# Patient Record
Sex: MALE | Race: BLACK OR AFRICAN AMERICAN | NOT HISPANIC OR LATINO
[De-identification: names, ages, dates, MRNs, and addresses within clinical notes are randomized per-mention and may not be internally consistent; named-entity substitution may affect disease eponyms.]

---

## 2023-03-20 ENCOUNTER — APPOINTMENT (OUTPATIENT)
Dept: VASCULAR SURGERY | Facility: CLINIC | Age: 58
End: 2023-03-20
Payer: MEDICARE

## 2023-03-20 VITALS — BODY MASS INDEX: 41.62 KG/M2 | TEMPERATURE: 98 F | WEIGHT: 259 LBS | HEIGHT: 66 IN

## 2023-03-20 VITALS — HEART RATE: 82 BPM | SYSTOLIC BLOOD PRESSURE: 121 MMHG | DIASTOLIC BLOOD PRESSURE: 83 MMHG

## 2023-03-20 PROCEDURE — 93970 EXTREMITY STUDY: CPT

## 2023-03-20 PROCEDURE — 99203 OFFICE O/P NEW LOW 30 MIN: CPT

## 2023-03-20 NOTE — ASSESSMENT
[FreeTextEntry1] : Impression venous insuff  and ongoing  rle dvt w  s/o hypercoag state\par \par \par Plan Med Conservative management leg elevation, knee high compression stockings 20-30mm Hg (rx given)\par continue xarelto  20 mg  till next ov \par d/w pt my s/o hypercoag states and recommended  hematologist to eval pt\par Referred to Dr DEMETRIS Mayorga MD \par d/w pt f/u w hematologist after rle dvt resolution \par rto in 4 weeks for rle venous duplex s/o dvt  then telehealth\par \par \par Letter faxed to Dr KAISER Adam MD \par \par \par \par Varicose veins are enlarged, twisted veins. Varicose veins are caused by increased blood pressure in the veins.  The blood moves towards the heart by 1-way valves in the veins. When the valves become weakened or damaged, blood can collect in the veins and pool in your lower legs (ankles). This causes the veins to become enlarged and incompetent with reflux. Sitting or standing for long periods can cause blood to pool in the leg veins, increasing the pressure within the veins. \par Risk factors for varicose veins or venous disease may include:  obesity, older age, standing or sitting for prolonged periods of time for several years, being female, pregnancy, taking oral contraceptive pills or hormone replacement, being inactive, and/or smoking. \par The most common symptoms of varicose veins are sensations in the legs, such as a heavy feeling, burning, and/or aching. However, each individual may experience symptoms differently.  Other symptoms may include:  color changes in the skin, sores on the legs, or rash.  Severe varicose veins or venous disease may eventually produce long-term mild swelling that can result in more serious skin and tissue problems, such as ulcers and non-healing sores.\par Varicose veins and venous disease are diagnosed by a complete medical history, physical examination, and diagnostic studies for varicose veins including duplex ultrasound and color-flow imaging.  \par Medical treatment for varicose veins and venous disease include:  compression stockings, sclerotherapy, endovenous ablation and/or surgical treatment with microphlebectomy.  \par \par \par  [Arterial/Venous Disease] : arterial/venous disease [Medication Management] : medication management [Other: _____] : [unfilled]

## 2023-03-20 NOTE — HISTORY OF PRESENT ILLNESS
[FreeTextEntry1] : pt states new onset of right calf pain \par dx w rt  PTV dvt in  jan 2023 and was placed on po xarelto\par \par Pt c/o bilateral leg swelling heaviness w activity even prior to this event \par Onset more than 6 mo ago \par Intensity mild \par Pt denies recent injury, travel or thrombophilic event\par \par Pt denies any recent  cardiac c/o , SOB, Chest Pain or recent heart attacks \par \par Pt denies any other  personal or fam  thrombophilic event \par pt denies recent  travel or airtravel or inj\par \par pt c/o ongoing left foot numbness after left foot surgery in 2014\par pt also states currently unrelated to activity  serina leg cramps \par pt states in the past eval  for  lle  neuro c/o w w/u sig for LSS  "problem"\par pt states no f/u was needed or recommended \par \par

## 2023-03-20 NOTE — PHYSICAL EXAM
[1+] : left 1+ [2+] : left 2+ [Ankle Swelling (On Exam)] : present [Ankle Swelling Bilaterally] : bilaterally  [Varicose Veins Of Lower Extremities] : bilaterally [] : bilaterally [Ankle Swelling On The Right] : mild [No Rash or Lesion] : No rash or lesion [Alert] : alert [Oriented to Person] : oriented to person [Oriented to Place] : oriented to place [Oriented to Time] : oriented to time [Calm] : calm [Right Carotid Bruit] : no bruit heard over the right carotid [Left Carotid Bruit] : no bruit heard over the left carotid [Abdomen Masses] : No abdominal masses [de-identified] : nad [de-identified] : wnl [FreeTextEntry1] : Mild  bilateral leg venous insufficiency \par w mild bilateral leg stasis dermatitis, hyperpigmentation in the gator area\par and mild bilateral leg edema \par Multiple  bilateral leg small varicose  veins and spider veins  ant post medial calf and shin \par RLE Varicose veins measuring  1-2 mm in size on the calf /shin \par LLE Varicose veins measuring 1-2  mm in size on the calf /shin \par no wounds/ulcers\par  [de-identified] : wnl [de-identified] : Mitch Cranial nerves 2-12 mitch grossly intact [de-identified] : cooperative

## 2023-03-20 NOTE — DATA REVIEWED
[FreeTextEntry1] : Outside facility study report reviewed 1/31/2023  RLE venous Duplex  sig for  PTV dvt \par \par Outside facility study report reviewed 2/17/2023  CTPA no evid of PE \par \par Outside facility study report reviewed 3/3/2023  RLE venous Duplex  no evid of dvt at this time \par \par 3/20/2023 Venous Doppler \par                            RLE sub acute  dvt svt in post tib vein\par                            LLE no dvt svt\par

## 2023-04-24 ENCOUNTER — APPOINTMENT (OUTPATIENT)
Dept: VASCULAR SURGERY | Facility: CLINIC | Age: 58
End: 2023-04-24
Payer: MEDICARE

## 2023-04-24 PROCEDURE — 93971 EXTREMITY STUDY: CPT | Mod: RT

## 2023-05-03 ENCOUNTER — APPOINTMENT (OUTPATIENT)
Dept: VASCULAR SURGERY | Facility: CLINIC | Age: 58
End: 2023-05-03
Payer: MEDICARE

## 2023-05-03 DIAGNOSIS — I82.441 ACUTE EMBOLISM AND THROMBOSIS OF RIGHT TIBIAL VEIN: ICD-10-CM

## 2023-05-03 PROCEDURE — 99442: CPT | Mod: 95

## 2023-05-03 NOTE — HISTORY OF PRESENT ILLNESS
[FreeTextEntry1] : pt states new onset of right calf pain \par dx w rt  PTV dvt in  jan 2023 and was placed on po xarelto\par \par Pt c/o bilateral leg swelling heaviness w activity even prior to this event \par Onset more than 6 mo ago \par Intensity mild \par Pt denies recent injury, travel or thrombophilic event\par \par Pt denies any recent  cardiac c/o , SOB, Chest Pain or recent heart attacks \par \par Pt denies any other  personal or fam  thrombophilic event \par pt denies recent  travel or airtravel or inj\par \par pt c/o ongoing left foot numbness after left foot surgery in 2014\par pt also states currently unrelated to activity  serina leg cramps \par pt states in the past eval  for  lle  neuro c/o w w/u sig for LSS  "problem"\par pt states no f/u was needed or recommended \par \par  [de-identified] : pt is on xarelto\par pt states no new c/o\par pt is thinking of traveling to [place] at the end of may 2023

## 2023-05-03 NOTE — DATA REVIEWED
[FreeTextEntry1] : Outside facility study report reviewed 1/31/2023  RLE venous Duplex  sig for  PTV dvt \par \par Outside facility study report reviewed 2/17/2023  CTPA no evid of PE \par \par Outside facility study report reviewed 3/3/2023  RLE venous Duplex  no evid of dvt at this time \par \par 3/20/2023 Venous Doppler \par                            RLE sub acute  dvt in post tib vein\par                            LLE no dvt svt\par                                \par 4/24/2023   Venous Doppler \par                            RLE acute  dvt in post tib vein\par                                    chronic changes in  LSV \par \par                            \par

## 2023-05-03 NOTE — PHYSICAL EXAM
[1+] : right 1+ [No Rash or Lesion] : No rash or lesion [Alert] : alert [Oriented to Person] : oriented to person [Oriented to Place] : oriented to place [Oriented to Time] : oriented to time [Calm] : calm [de-identified] : no resp distress [FreeTextEntry1] : Physical exam findings via telephonic review with patient \par \par  [de-identified] : cooperative

## 2023-05-03 NOTE — REASON FOR VISIT
[Home] : at home, [unfilled] , at the time of the visit. [Medical Office: ([facility])___] : at the medical office located in  [Other:____] : [unfilled] [Verbal consent obtained from patient] : the patient, [unfilled] [FreeTextEntry1] : i have a right leg blood clot

## 2023-05-03 NOTE — ASSESSMENT
[Arterial/Venous Disease] : arterial/venous disease [Medication Management] : medication management [Other: _____] : [unfilled] [FreeTextEntry1] : Impression venous insuff  and ongoing  rle dvt w  s/o hypercoag state\par \par \par Plan Med Conservative management leg elevation, knee high compression stockings 20-30mm Hg \par d/w pt that he may be a xarelto failure and  to consider  changing  to lovenox  or coumadin rx\par w inr surveillance \par pt wants to continue on xarelto for now\par i advised him if  limited progress occurs at next ov then will need  to be switched to lovenox or coumadin\par and at the  resolution of the dvt  may need a hypercoag w/u also \par continue xarelto  20 mg  till next ov \par above d/w Dr Jaramillo  heme \par advised pt to f/u w heme \par advised pt to not travel for now \par rto in 4 weeks for rle venous duplex s/o dvt  then telehealth\par Telephonic visit  time duration  14  min\par \par \par \par \par Letter faxed to Dr KAISER Adam MD \par \par \par \par Varicose veins are enlarged, twisted veins. Varicose veins are caused by increased blood pressure in the veins.  The blood moves towards the heart by 1-way valves in the veins. When the valves become weakened or damaged, blood can collect in the veins and pool in your lower legs (ankles). This causes the veins to become enlarged and incompetent with reflux. Sitting or standing for long periods can cause blood to pool in the leg veins, increasing the pressure within the veins. \par Risk factors for varicose veins or venous disease may include:  obesity, older age, standing or sitting for prolonged periods of time for several years, being female, pregnancy, taking oral contraceptive pills or hormone replacement, being inactive, and/or smoking. \par The most common symptoms of varicose veins are sensations in the legs, such as a heavy feeling, burning, and/or aching. However, each individual may experience symptoms differently.  Other symptoms may include:  color changes in the skin, sores on the legs, or rash.  Severe varicose veins or venous disease may eventually produce long-term mild swelling that can result in more serious skin and tissue problems, such as ulcers and non-healing sores.\par Varicose veins and venous disease are diagnosed by a complete medical history, physical examination, and diagnostic studies for varicose veins including duplex ultrasound and color-flow imaging.  \par Medical treatment for varicose veins and venous disease include:  compression stockings, sclerotherapy, endovenous ablation and/or surgical treatment with microphlebectomy.  \par \par \par

## 2023-06-06 ENCOUNTER — APPOINTMENT (OUTPATIENT)
Dept: VASCULAR SURGERY | Facility: CLINIC | Age: 58
End: 2023-06-06
Payer: MEDICARE

## 2023-06-06 VITALS
HEIGHT: 66 IN | HEART RATE: 85 BPM | BODY MASS INDEX: 41.62 KG/M2 | DIASTOLIC BLOOD PRESSURE: 82 MMHG | SYSTOLIC BLOOD PRESSURE: 115 MMHG | WEIGHT: 259 LBS | TEMPERATURE: 98 F

## 2023-06-06 PROCEDURE — 93971 EXTREMITY STUDY: CPT | Mod: RT

## 2023-06-06 PROCEDURE — 99214 OFFICE O/P EST MOD 30 MIN: CPT

## 2023-06-06 RX ORDER — TELMISARTAN AND HYDROCHLOROTHIAZIDE 80; 25 MG/1; MG/1
80-25 TABLET ORAL
Refills: 0 | Status: ACTIVE | COMMUNITY

## 2023-06-06 RX ORDER — RIVAROXABAN 2.5 MG/1
TABLET, FILM COATED ORAL
Refills: 0 | Status: ACTIVE | COMMUNITY

## 2023-06-06 NOTE — HISTORY OF PRESENT ILLNESS
[FreeTextEntry1] : pt states new onset of right calf pain \par dx w rt  PTV dvt in  jan 2023 and was placed on po xarelto\par \par Pt c/o bilateral leg swelling heaviness w activity even prior to this event \par Onset more than 6 mo ago \par Intensity mild \par Pt denies recent injury, travel or thrombophilic event\par \par Pt denies any recent  cardiac c/o , SOB, Chest Pain or recent heart attacks \par \par Pt denies any other  personal or fam  thrombophilic event \par pt denies recent  travel or airtravel or inj\par \par pt c/o ongoing left foot numbness after left foot surgery in 2014\par pt also states currently unrelated to activity  serina leg cramps \par pt states in the past eval  for  lle  neuro c/o w w/u sig for LSS  "problem"\par pt states no f/u was needed or recommended \par \par  [de-identified] : pt here for follow up for RLE dvt\par seen in the presence of his wife\par taking xarelto 20 mg daily\par states RLE swelling has improved \par experiences occasional discomfort on the right calf\par consistent with leg elevation\par wears knee high compression stockings 1-2x/week\par no new wounds or ulcers\par

## 2023-06-06 NOTE — DATA REVIEWED
[FreeTextEntry1] : Outside facility study report reviewed 1/31/2023  RLE venous Duplex  sig for  PTV dvt \par \par Outside facility study report reviewed 2/17/2023  CTPA no evid of PE \par \par Outside facility study report reviewed 3/3/2023  RLE venous Duplex  no evid of dvt at this time \par \par 3/20/2023 Venous Doppler \par                            RLE sub acute  dvt in post tib vein\par                            LLE no dvt svt\par                                \par 4/24/2023   Venous Doppler \par                            RLE acute  dvt in post tib vein\par                                    chronic changes in  LSV \par \par 6/6/2023     Venous doppler\par                            RLE chronic dvt with partial recanalization in post tib vein\par                                   chronic changes in LSV\par \par                            \par

## 2023-06-06 NOTE — PHYSICAL EXAM
[1+] : left 1+ [2+] : left 2+ [Ankle Swelling (On Exam)] : present [Ankle Swelling Bilaterally] : bilaterally  [Varicose Veins Of Lower Extremities] : bilaterally [] : bilaterally [Ankle Swelling On The Right] : mild [No Rash or Lesion] : No rash or lesion [Alert] : alert [Oriented to Person] : oriented to person [Oriented to Place] : oriented to place [Oriented to Time] : oriented to time [Calm] : calm [Right Carotid Bruit] : no bruit heard over the right carotid [Left Carotid Bruit] : no bruit heard over the left carotid [Abdomen Masses] : No abdominal masses [de-identified] : nad [de-identified] : wnl [de-identified] : no respiratory distress [FreeTextEntry1] : Mild  bilateral leg venous insufficiency \par w mild bilateral leg stasis dermatitis, hyperpigmentation in the gator area\par and mild bilateral leg edema \par Multiple  bilateral leg small varicose  veins and spider veins  ant post medial calf and shin \par RLE Varicose veins measuring  1-2 mm in size on the calf /shin \par LLE Varicose veins measuring 1-2  mm in size on the calf /shin \par no wounds/ulcers\par  [de-identified] : Mitch Cranial nerves 2-12 mitch grossly intact [de-identified] : wnl [de-identified] : cooperative

## 2023-06-06 NOTE — ASSESSMENT
[Arterial/Venous Disease] : arterial/venous disease [Medication Management] : medication management [Other: _____] : [unfilled] [FreeTextEntry1] : Impression venous insuff  and slow resolution of  rle dvt  s/o hypercoag state\par \par \par Plan Med Conservative management leg elevation, weight loss, diet control, exercise regimen, knee high compression stockings 20-30mm Hg \par advised pt to f/u w heme for hypercoag w/u\par continue xarelto 20 mg for 2 weeks\par switch to baby aspirin daily after completing xarelto \par rto in 4 weeks for rle venous duplex s/o dvt  to re eval then telehealth\par \par \par \par \par \par Varicose veins are enlarged, twisted veins. Varicose veins are caused by increased blood pressure in the veins.  The blood moves towards the heart by 1-way valves in the veins. When the valves become weakened or damaged, blood can collect in the veins and pool in your lower legs (ankles). This causes the veins to become enlarged and incompetent with reflux. Sitting or standing for long periods can cause blood to pool in the leg veins, increasing the pressure within the veins. \par Risk factors for varicose veins or venous disease may include:  obesity, older age, standing or sitting for prolonged periods of time for several years, being female, pregnancy, taking oral contraceptive pills or hormone replacement, being inactive, and/or smoking. \par The most common symptoms of varicose veins are sensations in the legs, such as a heavy feeling, burning, and/or aching. However, each individual may experience symptoms differently.  Other symptoms may include:  color changes in the skin, sores on the legs, or rash.  Severe varicose veins or venous disease may eventually produce long-term mild swelling that can result in more serious skin and tissue problems, such as ulcers and non-healing sores.\par Varicose veins and venous disease are diagnosed by a complete medical history, physical examination, and diagnostic studies for varicose veins including duplex ultrasound and color-flow imaging.  \par Medical treatment for varicose veins and venous disease include:  compression stockings, sclerotherapy, endovenous ablation and/or surgical treatment with microphlebectomy.  \par \par \par

## 2023-06-25 ENCOUNTER — TRANSCRIPTION ENCOUNTER (OUTPATIENT)
Age: 58
End: 2023-06-25

## 2023-07-11 ENCOUNTER — APPOINTMENT (OUTPATIENT)
Dept: VASCULAR SURGERY | Facility: CLINIC | Age: 58
End: 2023-07-11
Payer: MEDICARE

## 2023-07-11 VITALS
DIASTOLIC BLOOD PRESSURE: 76 MMHG | SYSTOLIC BLOOD PRESSURE: 114 MMHG | TEMPERATURE: 97.9 F | HEIGHT: 66 IN | BODY MASS INDEX: 43.55 KG/M2 | WEIGHT: 271 LBS | HEART RATE: 62 BPM

## 2023-07-11 VITALS — DIASTOLIC BLOOD PRESSURE: 76 MMHG | HEART RATE: 62 BPM | SYSTOLIC BLOOD PRESSURE: 114 MMHG

## 2023-07-11 PROCEDURE — 93971 EXTREMITY STUDY: CPT | Mod: RT

## 2023-07-11 PROCEDURE — 99214 OFFICE O/P EST MOD 30 MIN: CPT

## 2023-07-11 NOTE — PHYSICAL EXAM
[1+] : left 1+ [2+] : left 2+ [Ankle Swelling (On Exam)] : present [Ankle Swelling Bilaterally] : bilaterally  [Varicose Veins Of Lower Extremities] : bilaterally [] : bilaterally [Ankle Swelling On The Right] : mild [No Rash or Lesion] : No rash or lesion [Alert] : alert [Oriented to Person] : oriented to person [Oriented to Place] : oriented to place [Oriented to Time] : oriented to time [Calm] : calm [Right Carotid Bruit] : no bruit heard over the right carotid [Left Carotid Bruit] : no bruit heard over the left carotid [Abdomen Masses] : No abdominal masses [de-identified] : nad [de-identified] : wnl [de-identified] : no respiratory distress [FreeTextEntry1] : Mild  bilateral leg venous insufficiency \par w mild bilateral leg stasis dermatitis, hyperpigmentation in the gator area\par and mild bilateral leg edema \par Multiple  bilateral leg small varicose  veins and spider veins  ant post medial calf and shin \par RLE Varicose veins measuring  1-2 mm in size on the calf /shin \par LLE Varicose veins measuring 1-2  mm in size on the calf /shin \par no wounds/ulcers\par  [de-identified] : wnl [de-identified] : Mitch Cranial nerves 2-12 mitch grossly intact [de-identified] : cooperative

## 2023-07-11 NOTE — HISTORY OF PRESENT ILLNESS
[FreeTextEntry1] : pt states new onset of right calf pain \par dx w rt  PTV dvt in  jan 2023 and was placed on po xarelto\par \par Pt c/o bilateral leg swelling heaviness w activity even prior to this event \par Onset more than 6 mo ago \par Intensity mild \par Pt denies recent injury, travel or thrombophilic event\par \par Pt denies any recent  cardiac c/o , SOB, Chest Pain or recent heart attacks \par \par Pt denies any other  personal or fam  thrombophilic event \par pt denies recent  travel or airtravel or inj\par \par pt c/o ongoing left foot numbness after left foot surgery in 2014\par pt also states currently unrelated to activity  serina leg cramps \par pt states in the past eval  for  lle  neuro c/o w w/u sig for LSS  "problem"\par pt states no f/u was needed or recommended \par \par  [de-identified] : pt here for 1 month follow up for RLE dvt\par experiences occasional aches in bilateral calf\par overall pt legs feel the same compared to last visit\par consistent with leg elevation\par wears knee high compression stockings 4x/week\par no longer takes xarelto\par taking low dose asa daily\par no new wounds or ulcers\par pt states he has a follow up appointment with his hematologist later on this month\par

## 2023-07-11 NOTE — ASSESSMENT
[Arterial/Venous Disease] : arterial/venous disease [Medication Management] : medication management [Other: _____] : [unfilled] [FreeTextEntry1] : Impression venous insuff  and slow resolution of  rle dvt  s/o hypercoag state, chronic rle dvt in ptv\par \par \par Plan Med Conservative management leg elevation, weight loss, diet control, exercise regimen, knee high compression stockings 20-30mm Hg \par advised pt to f/u w heme for hypercoag w/u\par continue low dose asa daily\par telehealth in December 2023 to evaluate bilateral lower extremities\par \par \par \par \par \par Varicose veins are enlarged, twisted veins. Varicose veins are caused by increased blood pressure in the veins.  The blood moves towards the heart by 1-way valves in the veins. When the valves become weakened or damaged, blood can collect in the veins and pool in your lower legs (ankles). This causes the veins to become enlarged and incompetent with reflux. Sitting or standing for long periods can cause blood to pool in the leg veins, increasing the pressure within the veins. \par Risk factors for varicose veins or venous disease may include:  obesity, older age, standing or sitting for prolonged periods of time for several years, being female, pregnancy, taking oral contraceptive pills or hormone replacement, being inactive, and/or smoking. \par The most common symptoms of varicose veins are sensations in the legs, such as a heavy feeling, burning, and/or aching. However, each individual may experience symptoms differently.  Other symptoms may include:  color changes in the skin, sores on the legs, or rash.  Severe varicose veins or venous disease may eventually produce long-term mild swelling that can result in more serious skin and tissue problems, such as ulcers and non-healing sores.\par Varicose veins and venous disease are diagnosed by a complete medical history, physical examination, and diagnostic studies for varicose veins including duplex ultrasound and color-flow imaging.  \par Medical treatment for varicose veins and venous disease include:  compression stockings, sclerotherapy, endovenous ablation and/or surgical treatment with microphlebectomy.  \par \par \par

## 2023-07-11 NOTE — DATA REVIEWED
[FreeTextEntry1] : Outside facility study report reviewed 1/31/2023  RLE venous Duplex  sig for  PTV dvt \par \par Outside facility study report reviewed 2/17/2023  CTPA no evid of PE \par \par Outside facility study report reviewed 3/3/2023  RLE venous Duplex  no evid of dvt at this time \par \par 3/20/2023 Venous Doppler \par                            RLE sub acute  dvt in post tib vein\par                            LLE no dvt svt\par                                \par 4/24/2023   Venous Doppler \par                            RLE acute  dvt in post tib vein\par                                    chronic changes in  LSV \par \par 6/6/2023     Venous doppler\par                            RLE chronic dvt with partial recanalization in post tib vein\par                                   chronic changes in LSV\par \par 7/11/2023 Venous Doppler\par                           RLE minimal chronic thrombus with recanalization in post tib vein mid calf\par                                    chronic changes in LSV                           \par

## 2023-10-10 ENCOUNTER — APPOINTMENT (OUTPATIENT)
Dept: VASCULAR SURGERY | Facility: CLINIC | Age: 58
End: 2023-10-10
Payer: MEDICARE

## 2023-10-10 VITALS — WEIGHT: 247 LBS | HEIGHT: 66 IN | BODY MASS INDEX: 39.7 KG/M2 | TEMPERATURE: 97.6 F

## 2023-10-10 VITALS — SYSTOLIC BLOOD PRESSURE: 113 MMHG | HEART RATE: 90 BPM | DIASTOLIC BLOOD PRESSURE: 80 MMHG

## 2023-10-10 DIAGNOSIS — I80.00 PHLEBITIS AND THROMBOPHLEBITIS OF SUPERFICIAL VESSELS OF UNSPECIFIED LOWER EXTREMITY: ICD-10-CM

## 2023-10-10 DIAGNOSIS — Z00.00 ENCOUNTER FOR GENERAL ADULT MEDICAL EXAMINATION W/OUT ABNORMAL FINDINGS: ICD-10-CM

## 2023-10-10 DIAGNOSIS — I82.5Z1 CHRONIC EMBOLISM AND THROMBOSIS OF UNSPECIFIED DEEP VEINS OF RIGHT DISTAL LOWER EXTREMITY: ICD-10-CM

## 2023-10-10 PROCEDURE — 99214 OFFICE O/P EST MOD 30 MIN: CPT

## 2023-10-10 PROCEDURE — 93970 EXTREMITY STUDY: CPT

## 2023-10-10 PROCEDURE — ZZZZZ: CPT

## 2023-10-10 RX ORDER — TIRZEPATIDE 5 MG/.5ML
5 INJECTION, SOLUTION SUBCUTANEOUS
Refills: 0 | Status: ACTIVE | COMMUNITY

## 2023-10-10 RX ORDER — TOPIRAMATE 100 MG/1
100 TABLET, FILM COATED ORAL
Refills: 0 | Status: ACTIVE | COMMUNITY

## 2023-10-10 RX ORDER — TRAZODONE HYDROCHLORIDE 300 MG/1
TABLET ORAL
Refills: 0 | Status: ACTIVE | COMMUNITY

## 2023-12-13 ENCOUNTER — APPOINTMENT (OUTPATIENT)
Dept: VASCULAR SURGERY | Facility: CLINIC | Age: 58
End: 2023-12-13
Payer: MEDICARE

## 2023-12-13 PROCEDURE — 99442: CPT | Mod: 95

## 2023-12-13 NOTE — REASON FOR VISIT
[Home] : at home, [unfilled] , at the time of the visit. [Medical Office: ([facility])___] : at the medical office located in  [Other:____] : [unfilled] [Verbal consent obtained from patient] : the patient, [unfilled] [Follow-Up: _____] : a [unfilled] follow-up visit [Spouse] : spouse [FreeTextEntry1] : my legs bother me  still

## 2023-12-13 NOTE — ASSESSMENT
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 Varicose veins are caused by increased blood pressure in the veins. The blood moves towards the heart by 1-way valves in the veins. When the valves become weakened or damaged, blood can collect in the veins and pool in your lower legs (ankles). This causes the veins to become enlarged and incompetent with reflux. Sitting or standing for long periods can cause blood to pool in the leg veins, increasing the pressure within the veins. Risk factors for varicose veins or venous disease may include: obesity, older age, standing or sitting for prolonged periods of time for several years, being female, pregnancy, taking oral contraceptive pills or hormone replacement, being inactive, and/or smoking. The most common symptoms of varicose veins are sensations in the legs, such as a heavy feeling, burning, and/or aching. However, each individual may experience symptoms differently. Other symptoms may include: color changes in the skin, sores on the legs, or rash. Severe varicose veins or venous disease may eventually produce long-term mild swelling that can result in more serious skin and tissue problems, such as ulcers and non-healing sores. Varicose veins and venous disease are diagnosed by a complete medical history, physical examination, and diagnostic studies for varicose veins including duplex ultrasound and color-flow imaging. Medical treatment for varicose veins and venous disease include: compression stockings, sclerotherapy, endovenous ablation and/or surgical treatment with microphlebectomy.     Patient Education Given: arterial/venous disease, medication management and comp stockings [Arterial/Venous Disease] : arterial/venous disease [Medication Management] : medication management [Other: _____] : [unfilled]

## 2023-12-13 NOTE — PHYSICAL EXAM
[No Rash or Lesion] : No rash or lesion [Alert] : alert [Oriented to Person] : oriented to person [Oriented to Place] : oriented to place [Oriented to Time] : oriented to time [Calm] : calm [de-identified] : no respiratory distress [FreeTextEntry1] : Physical exam findings via telephonic review with patient   [de-identified] : cooperative

## 2023-12-13 NOTE — HISTORY OF PRESENT ILLNESS
[FreeTextEntry1] : pt states new onset of right calf pain \par  dx w rt  PTV dvt in  jan 2023 and was placed on po xarelto\par  \par  Pt c/o bilateral leg swelling heaviness w activity even prior to this event \par  Onset more than 6 mo ago \par  Intensity mild \par  Pt denies recent injury, travel or thrombophilic event\par  \par  Pt denies any recent  cardiac c/o , SOB, Chest Pain or recent heart attacks \par  \par  Pt denies any other  personal or fam  thrombophilic event \par  pt denies recent  travel or airtravel or inj\par  \par  pt c/o ongoing left foot numbness after left foot surgery in 2014\par  pt also states currently unrelated to activity  serina leg cramps \par  pt states in the past eval  for  lle  neuro c/o w w/u sig for LSS  "problem"\par  pt states no f/u was needed or recommended \par  \par   [de-identified] : Pt is compliant w  compression stockings taking low dose asa daily pt c/o  serina ue numbness and tingling  and lower legs discomfort  toribio when lying down sx remain  pt  has made  appt w neurologist pt is undergoing hypercoag w/u

## 2023-12-13 NOTE — DATA REVIEWED
[FreeTextEntry1] : Outside facility study report reviewed 1/31/2023  RLE venous Duplex  sig for  PTV dvt   Outside facility study report reviewed 2/17/2023  CTPA no evid of PE   Outside facility study report reviewed 3/3/2023  RLE venous Duplex  no evid of dvt at this time   3/20/2023 Venous Doppler                             RLE sub acute  dvt in post tib vein                            LLE no dvt svt                                 4/24/2023   Venous Doppler                             RLE acute  dvt in post tib vein                                    chronic changes in  LSV   6/6/2023     Venous doppler                            RLE chronic dvt with partial recanalization in post tib vein                                   chronic changes in LSV  7/11/2023 Venous Doppler                           RLE minimal chronic thrombus with recanalization in post tib vein mid calf                                    chronic changes in LSV                                  10/10/2023  Venous Doppler                            Mitch LE no cvi                           RLE  recannaliz chronic dvt in  PTV, chronic wall changes in lsv                                          LLE sig for chronic wall changes in lsv

## 2024-02-07 ENCOUNTER — APPOINTMENT (OUTPATIENT)
Dept: VASCULAR SURGERY | Facility: CLINIC | Age: 59
End: 2024-02-07
Payer: MEDICARE

## 2024-02-07 DIAGNOSIS — I87.009 POSTTHROMBOTIC SYNDROME W/OUT COMPLICATIONS OF UNSPECIFIED EXTREMITY: ICD-10-CM

## 2024-02-07 PROCEDURE — 99442: CPT

## 2024-02-07 NOTE — PHYSICAL EXAM
[Alert] : alert [Oriented to Person] : oriented to person [Oriented to Place] : oriented to place [Oriented to Time] : oriented to time [Calm] : calm [de-identified] : no respiratory distress [FreeTextEntry1] : Physical exam findings via telephonic review with patient   [de-identified] : cooperative

## 2024-02-07 NOTE — HISTORY OF PRESENT ILLNESS
[FreeTextEntry1] : pt states new onset of right calf pain \par  dx w rt  PTV dvt in  jan 2023 and was placed on po xarelto\par  \par  Pt c/o bilateral leg swelling heaviness w activity even prior to this event \par  Onset more than 6 mo ago \par  Intensity mild \par  Pt denies recent injury, travel or thrombophilic event\par  \par  Pt denies any recent  cardiac c/o , SOB, Chest Pain or recent heart attacks \par  \par  Pt denies any other  personal or fam  thrombophilic event \par  pt denies recent  travel or airtravel or inj\par  \par  pt c/o ongoing left foot numbness after left foot surgery in 2014\par  pt also states currently unrelated to activity  serina leg cramps \par  pt states in the past eval  for  lle  neuro c/o w w/u sig for LSS  "problem"\par  pt states no f/u was needed or recommended \par  \par   [de-identified] : Pt is compliant w  compression stockings taking low dose asa daily pt c/o  serina ue numbness and tingling  and lower legs discomfort  toribio when lying down sx remain  but have improved  pt states hypercoag w/u was neg

## 2024-02-07 NOTE — ASSESSMENT
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 Varicose veins are caused by increased blood pressure in the veins. The blood moves towards the heart by 1-way valves in the veins. When the valves become weakened or damaged, blood can collect in the veins and pool in your lower legs (ankles). This causes the veins to become enlarged and incompetent with reflux. Sitting or standing for long periods can cause blood to pool in the leg veins, increasing the pressure within the veins. Risk factors for varicose veins or venous disease may include: obesity, older age, standing or sitting for prolonged periods of time for several years, being female, pregnancy, taking oral contraceptive pills or hormone replacement, being inactive, and/or smoking. The most common symptoms of varicose veins are sensations in the legs, such as a heavy feeling, burning, and/or aching. However, each individual may experience symptoms differently. Other symptoms may include: color changes in the skin, sores on the legs, or rash. Severe varicose veins or venous disease may eventually produce long-term mild swelling that can result in more serious skin and tissue problems, such as ulcers and non-healing sores. Varicose veins and venous disease are diagnosed by a complete medical history, physical examination, and diagnostic studies for varicose veins including duplex ultrasound and color-flow imaging. Medical treatment for varicose veins and venous disease include: compression stockings, sclerotherapy, endovenous ablation and/or surgical treatment with microphlebectomy.     Patient Education Given: arterial/venous disease, medication management and comp stockings [Arterial/Venous Disease] : arterial/venous disease [Medication Management] : medication management [Other: _____] : [unfilled]

## 2024-02-07 NOTE — REASON FOR VISIT
[Follow-Up: _____] : a [unfilled] follow-up visit [Source: ______] : History obtained from [unfilled] [FreeTextEntry1] : my legs bother me  still but less [Home] : at home, [unfilled] , at the time of the visit. [Medical Office: ([facility])___] : at the medical office located in  [Other:____] : [unfilled] [Verbal consent obtained from patient] : the patient, [unfilled]

## 2024-02-16 ENCOUNTER — APPOINTMENT (OUTPATIENT)
Dept: VASCULAR SURGERY | Facility: CLINIC | Age: 59
End: 2024-02-16
Payer: MEDICARE

## 2024-02-16 ENCOUNTER — NON-APPOINTMENT (OUTPATIENT)
Age: 59
End: 2024-02-16

## 2024-02-16 PROCEDURE — 93970 EXTREMITY STUDY: CPT

## 2024-02-20 ENCOUNTER — APPOINTMENT (OUTPATIENT)
Dept: VASCULAR SURGERY | Facility: CLINIC | Age: 59
End: 2024-02-20

## 2024-06-04 ENCOUNTER — APPOINTMENT (OUTPATIENT)
Dept: VASCULAR SURGERY | Facility: CLINIC | Age: 59
End: 2024-06-04
Payer: MEDICARE

## 2024-06-04 VITALS
SYSTOLIC BLOOD PRESSURE: 132 MMHG | WEIGHT: 230 LBS | HEIGHT: 66 IN | DIASTOLIC BLOOD PRESSURE: 90 MMHG | BODY MASS INDEX: 36.96 KG/M2 | HEART RATE: 81 BPM

## 2024-06-04 VITALS — SYSTOLIC BLOOD PRESSURE: 125 MMHG | DIASTOLIC BLOOD PRESSURE: 85 MMHG | HEART RATE: 80 BPM | TEMPERATURE: 98.8 F

## 2024-06-04 DIAGNOSIS — I87.2 VENOUS INSUFFICIENCY (CHRONIC) (PERIPHERAL): ICD-10-CM

## 2024-06-04 DIAGNOSIS — I83.893 VARICOSE VEINS OF BILATERAL LOWER EXTREMITIES WITH OTHER COMPLICATIONS: ICD-10-CM

## 2024-06-04 PROCEDURE — 99214 OFFICE O/P EST MOD 30 MIN: CPT

## 2024-06-04 PROCEDURE — 93970 EXTREMITY STUDY: CPT

## 2024-06-04 RX ORDER — TIRZEPATIDE 7.5 MG/.5ML
7.5 INJECTION, SOLUTION SUBCUTANEOUS
Refills: 0 | Status: ACTIVE | COMMUNITY

## 2024-06-04 NOTE — ASSESSMENT
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 Varicose veins are caused by increased blood pressure in the veins. The blood moves towards the heart by 1-way valves in the veins. When the valves become weakened or damaged, blood can collect in the veins and pool in your lower legs (ankles). This causes the veins to become enlarged and incompetent with reflux. Sitting or standing for long periods can cause blood to pool in the leg veins, increasing the pressure within the veins. Risk factors for varicose veins or venous disease may include: obesity, older age, standing or sitting for prolonged periods of time for several years, being female, pregnancy, taking oral contraceptive pills or hormone replacement, being inactive, and/or smoking. The most common symptoms of varicose veins are sensations in the legs, such as a heavy feeling, burning, and/or aching. However, each individual may experience symptoms differently. Other symptoms may include: color changes in the skin, sores on the legs, or rash. Severe varicose veins or venous disease may eventually produce long-term mild swelling that can result in more serious skin and tissue problems, such as ulcers and non-healing sores. Varicose veins and venous disease are diagnosed by a complete medical history, physical examination, and diagnostic studies for varicose veins including duplex ultrasound and color-flow imaging. Medical treatment for varicose veins and venous disease include: compression stockings, sclerotherapy, endovenous ablation and/or surgical treatment with microphlebectomy.     Patient Education Given: arterial/venous disease, medication management and comp stockings

## 2024-06-04 NOTE — DATA REVIEWED
[FreeTextEntry1] : Outside facility study report reviewed 1/31/2023  RLE venous Duplex  sig for  PTV dvt   Outside facility study report reviewed 2/17/2023  CTPA no evid of PE   Outside facility study report reviewed 3/3/2023  RLE venous Duplex  no evid of dvt at this time   3/20/2023 Venous Doppler                             RLE sub acute  dvt in post tib vein                            LLE no dvt svt                                 4/24/2023   Venous Doppler                             RLE acute  dvt in post tib vein                                    chronic changes in  LSV   6/6/2023     Venous doppler                            RLE chronic dvt with partial recanalization in post tib vein                                   chronic changes in LSV  7/11/2023 Venous Doppler                           RLE minimal chronic thrombus with recanalization in post tib vein mid calf                                    chronic changes in LSV                                  10/10/2023  Venous Doppler                            Mitch LE no cvi                           RLE  recannaliz chronic dvt in  PTV, chronic wall changes in lsv                                          LLE sig for chronic wall changes in lsv                 6/4/2024 Mitch LE venous doppler no acute dvt svt                            no sono evidence of insuffiency                            RLE sig for  chronic recannaliz  thrombus  in  1 of 2 ptv                             LLE chronic changes in LSV

## 2024-06-04 NOTE — HISTORY OF PRESENT ILLNESS
[FreeTextEntry1] : pt states new onset of right calf pain \par  dx w rt  PTV dvt in  jan 2023 and was placed on po xarelto\par  \par  Pt c/o bilateral leg swelling heaviness w activity even prior to this event \par  Onset more than 6 mo ago \par  Intensity mild \par  Pt denies recent injury, travel or thrombophilic event\par  \par  Pt denies any recent  cardiac c/o , SOB, Chest Pain or recent heart attacks \par  \par  Pt denies any other  personal or fam  thrombophilic event \par  pt denies recent  travel or airtravel or inj\par  \par  pt c/o ongoing left foot numbness after left foot surgery in 2014\par  pt also states currently unrelated to activity  serina leg cramps \par  pt states in the past eval  for  lle  neuro c/o w w/u sig for LSS  "problem"\par  pt states no f/u was needed or recommended \par  \par   [de-identified] : Pt c/o bilateral leg discomfort reports heaviness, achiness, fatigue and soreness worse when sitting down states he came back from [place] last week does not wear compression stockings daily taking low dose asa daily states he saw his neurologist  diagnosed with "pinched nerve"

## 2024-06-04 NOTE — PHYSICAL EXAM
[1+] : left 1+ [2+] : left 2+ [] : bilaterally [No Rash or Lesion] : No rash or lesion [Alert] : alert [Oriented to Person] : oriented to person [Oriented to Place] : oriented to place [Oriented to Time] : oriented to time [Calm] : calm [Varicose Veins Of Lower Extremities] : bilaterally [Ankle Swelling On The Right] : mild [Right Carotid Bruit] : no bruit heard over the right carotid [Left Carotid Bruit] : no bruit heard over the left carotid [Ankle Swelling (On Exam)] : not present [de-identified] : nad [de-identified] : wnl [de-identified] : no respiratory distress [FreeTextEntry1] : Mild  bilateral leg venous insufficiency  w mild bilateral leg stasis dermatitis, hyperpigmentation in the gator area and mild bilateral leg edema  Multiple  bilateral leg small varicose  veins and spider veins  ant post medial calf and shin  RLE Varicose veins measuring  1-2 mm in size on the calf /shin  LLE Varicose veins measuring 1-2  mm in size on the calf /shin  no wounds/ulcers  [de-identified] : wnl [de-identified] : Mitch Cranial nerves 2-12 mitch grossly intact [de-identified] : cooperative

## 2024-12-10 ENCOUNTER — APPOINTMENT (OUTPATIENT)
Dept: VASCULAR SURGERY | Facility: CLINIC | Age: 59
End: 2024-12-10
Payer: MEDICARE

## 2024-12-10 VITALS
BODY MASS INDEX: 39.21 KG/M2 | TEMPERATURE: 97.8 F | HEIGHT: 66 IN | HEART RATE: 65 BPM | SYSTOLIC BLOOD PRESSURE: 125 MMHG | DIASTOLIC BLOOD PRESSURE: 73 MMHG | WEIGHT: 244 LBS

## 2024-12-10 DIAGNOSIS — I87.2 VENOUS INSUFFICIENCY (CHRONIC) (PERIPHERAL): ICD-10-CM

## 2024-12-10 DIAGNOSIS — I83.893 VARICOSE VEINS OF BILATERAL LOWER EXTREMITIES WITH OTHER COMPLICATIONS: ICD-10-CM

## 2024-12-10 PROCEDURE — 99406 BEHAV CHNG SMOKING 3-10 MIN: CPT

## 2024-12-10 PROCEDURE — 99213 OFFICE O/P EST LOW 20 MIN: CPT | Mod: 25

## 2024-12-10 RX ORDER — VILAZODONE HYDROCHLORIDE 20 MG/1
20 TABLET, FILM COATED ORAL
Refills: 0 | Status: ACTIVE | COMMUNITY

## 2025-06-10 ENCOUNTER — APPOINTMENT (OUTPATIENT)
Dept: VASCULAR SURGERY | Facility: CLINIC | Age: 60
End: 2025-06-10
Payer: MEDICARE

## 2025-06-10 VITALS
SYSTOLIC BLOOD PRESSURE: 131 MMHG | DIASTOLIC BLOOD PRESSURE: 85 MMHG | TEMPERATURE: 98.5 F | HEART RATE: 65 BPM | WEIGHT: 250 LBS | HEIGHT: 66 IN | BODY MASS INDEX: 40.18 KG/M2

## 2025-06-10 PROCEDURE — 93970 EXTREMITY STUDY: CPT

## 2025-06-10 PROCEDURE — 99214 OFFICE O/P EST MOD 30 MIN: CPT

## 2025-07-09 ENCOUNTER — APPOINTMENT (OUTPATIENT)
Dept: BARIATRICS/WEIGHT MGMT | Facility: CLINIC | Age: 60
End: 2025-07-09

## 2025-07-09 ENCOUNTER — OUTPATIENT (OUTPATIENT)
Dept: OUTPATIENT SERVICES | Facility: HOSPITAL | Age: 60
LOS: 1 days | End: 2025-07-09

## 2025-07-09 DIAGNOSIS — I10 ESSENTIAL (PRIMARY) HYPERTENSION: ICD-10-CM

## 2025-07-09 PROCEDURE — 99205 OFFICE O/P NEW HI 60 MIN: CPT | Mod: 95

## 2025-07-09 PROCEDURE — G2211 COMPLEX E/M VISIT ADD ON: CPT | Mod: 95

## 2025-07-15 RX ORDER — TIRZEPATIDE 2.5 MG/.5ML
2.5 INJECTION, SOLUTION SUBCUTANEOUS
Qty: 4 | Refills: 5 | Status: ACTIVE | COMMUNITY
Start: 2025-07-15 | End: 1900-01-01

## 2025-07-28 ENCOUNTER — OUTPATIENT (OUTPATIENT)
Dept: OUTPATIENT SERVICES | Facility: HOSPITAL | Age: 60
LOS: 1 days | End: 2025-07-28

## 2025-07-28 ENCOUNTER — APPOINTMENT (OUTPATIENT)
Dept: BARIATRICS/WEIGHT MGMT | Facility: CLINIC | Age: 60
End: 2025-07-28
Payer: MEDICARE

## 2025-07-28 VITALS — BODY MASS INDEX: 39.7 KG/M2 | WEIGHT: 247 LBS | HEIGHT: 66 IN

## 2025-07-28 DIAGNOSIS — I10 ESSENTIAL (PRIMARY) HYPERTENSION: ICD-10-CM

## 2025-07-28 PROCEDURE — 99214 OFFICE O/P EST MOD 30 MIN: CPT | Mod: 95

## 2025-08-04 RX ORDER — TIRZEPATIDE 2.5 MG/.5ML
2.5 INJECTION, SOLUTION SUBCUTANEOUS
Qty: 1 | Refills: 5 | Status: ACTIVE | COMMUNITY
Start: 2025-07-29 | End: 1900-01-01

## 2025-08-11 ENCOUNTER — APPOINTMENT (OUTPATIENT)
Dept: BARIATRICS/WEIGHT MGMT | Facility: CLINIC | Age: 60
End: 2025-08-11
Payer: MEDICARE

## 2025-08-11 ENCOUNTER — OUTPATIENT (OUTPATIENT)
Dept: OUTPATIENT SERVICES | Facility: HOSPITAL | Age: 60
LOS: 1 days | End: 2025-08-11

## 2025-08-11 VITALS — BODY MASS INDEX: 39.86 KG/M2 | HEIGHT: 66 IN | WEIGHT: 248 LBS

## 2025-08-11 DIAGNOSIS — E66.9 OBESITY, UNSPECIFIED: ICD-10-CM

## 2025-08-11 DIAGNOSIS — I10 ESSENTIAL (PRIMARY) HYPERTENSION: ICD-10-CM

## 2025-08-11 PROCEDURE — 99214 OFFICE O/P EST MOD 30 MIN: CPT | Mod: 95

## 2025-08-15 ENCOUNTER — APPOINTMENT (OUTPATIENT)
Dept: GASTROENTEROLOGY | Facility: CLINIC | Age: 60
End: 2025-08-15
Payer: MEDICARE

## 2025-08-15 VITALS
DIASTOLIC BLOOD PRESSURE: 88 MMHG | SYSTOLIC BLOOD PRESSURE: 133 MMHG | BODY MASS INDEX: 38.89 KG/M2 | HEART RATE: 74 BPM | WEIGHT: 242 LBS | HEIGHT: 66 IN | OXYGEN SATURATION: 97 %

## 2025-08-15 DIAGNOSIS — Z83.3 FAMILY HISTORY OF DIABETES MELLITUS: ICD-10-CM

## 2025-08-15 DIAGNOSIS — Z82.49 FAMILY HISTORY OF ISCHEMIC HEART DISEASE AND OTHER DISEASES OF THE CIRCULATORY SYSTEM: ICD-10-CM

## 2025-08-15 DIAGNOSIS — Z86.79 PERSONAL HISTORY OF OTHER DISEASES OF THE CIRCULATORY SYSTEM: ICD-10-CM

## 2025-08-15 DIAGNOSIS — Z87.898 PERSONAL HISTORY OF OTHER SPECIFIED CONDITIONS: ICD-10-CM

## 2025-08-15 DIAGNOSIS — Z87.891 PERSONAL HISTORY OF NICOTINE DEPENDENCE: ICD-10-CM

## 2025-08-15 DIAGNOSIS — Z86.718 PERSONAL HISTORY OF OTHER VENOUS THROMBOSIS AND EMBOLISM: ICD-10-CM

## 2025-08-15 PROCEDURE — 99203 OFFICE O/P NEW LOW 30 MIN: CPT

## 2025-08-27 ENCOUNTER — APPOINTMENT (OUTPATIENT)
Dept: BARIATRICS/WEIGHT MGMT | Facility: CLINIC | Age: 60
End: 2025-08-27
Payer: MEDICARE

## 2025-08-27 ENCOUNTER — OUTPATIENT (OUTPATIENT)
Dept: OUTPATIENT SERVICES | Facility: HOSPITAL | Age: 60
LOS: 1 days | End: 2025-08-27

## 2025-08-27 DIAGNOSIS — I10 ESSENTIAL (PRIMARY) HYPERTENSION: ICD-10-CM

## 2025-08-27 DIAGNOSIS — E66.9 OBESITY, UNSPECIFIED: ICD-10-CM

## 2025-08-27 PROCEDURE — 99213 OFFICE O/P EST LOW 20 MIN: CPT | Mod: 95

## 2025-08-27 PROCEDURE — G2211 COMPLEX E/M VISIT ADD ON: CPT | Mod: 95

## 2025-09-02 ENCOUNTER — APPOINTMENT (OUTPATIENT)
Dept: GASTROENTEROLOGY | Facility: CLINIC | Age: 60
End: 2025-09-02

## 2025-09-06 ENCOUNTER — NON-APPOINTMENT (OUTPATIENT)
Age: 60
End: 2025-09-06

## 2025-09-08 ENCOUNTER — APPOINTMENT (OUTPATIENT)
Dept: GASTROENTEROLOGY | Facility: CLINIC | Age: 60
End: 2025-09-08
Payer: MEDICARE

## 2025-09-08 VITALS
SYSTOLIC BLOOD PRESSURE: 134 MMHG | OXYGEN SATURATION: 98 % | WEIGHT: 234 LBS | TEMPERATURE: 98.7 F | HEART RATE: 71 BPM | BODY MASS INDEX: 37.61 KG/M2 | HEIGHT: 66 IN | DIASTOLIC BLOOD PRESSURE: 87 MMHG

## 2025-09-08 PROCEDURE — 99213 OFFICE O/P EST LOW 20 MIN: CPT

## 2025-09-18 ENCOUNTER — APPOINTMENT (OUTPATIENT)
Dept: BARIATRICS/WEIGHT MGMT | Facility: CLINIC | Age: 60
End: 2025-09-18
Payer: MEDICARE

## 2025-09-18 VITALS — BODY MASS INDEX: 37.65 KG/M2 | WEIGHT: 234.25 LBS | HEIGHT: 66 IN

## 2025-09-18 DIAGNOSIS — E66.9 OBESITY, UNSPECIFIED: ICD-10-CM

## 2025-09-18 PROCEDURE — 99213 OFFICE O/P EST LOW 20 MIN: CPT | Mod: 95

## 2025-09-18 RX ORDER — TELMISARTAN 80 MG/1
80 TABLET ORAL
Refills: 0 | Status: ACTIVE | COMMUNITY
Start: 2025-09-18